# Patient Record
Sex: MALE | Race: WHITE | NOT HISPANIC OR LATINO | URBAN - METROPOLITAN AREA
[De-identification: names, ages, dates, MRNs, and addresses within clinical notes are randomized per-mention and may not be internally consistent; named-entity substitution may affect disease eponyms.]

---

## 2018-10-09 ENCOUNTER — OFFICE VISIT (OUTPATIENT)
Dept: URBAN - METROPOLITAN AREA CLINIC 76 | Facility: CLINIC | Age: 83
End: 2018-10-09
Payer: COMMERCIAL

## 2018-10-09 DIAGNOSIS — H02.834 DERMATOCHALASIS OF LEFT UPPER EYELID: ICD-10-CM

## 2018-10-09 DIAGNOSIS — H02.831 DERMATOCHALASIS OF RIGHT UPPER EYELID: Primary | ICD-10-CM

## 2018-10-09 PROCEDURE — 99204 OFFICE O/P NEW MOD 45 MIN: CPT | Performed by: OPHTHALMOLOGY

## 2018-10-09 ASSESSMENT — INTRAOCULAR PRESSURE
OD: 12
OS: 10

## 2018-10-09 NOTE — IMPRESSION/PLAN
Impression: Dermatochalasis of right upper eyelid: H02.831. Plan: Discussed diagnosis in detail with patient. Discussed treatment options with patient. Surgical treatment is required. Surgical risks and benefits were discussed, explained and understood by patient. Patient elects to have surgery. RL3. HVF 36 superior taped and untaped ordered today, if visually significant recommend Bilateral Upper Eyelid Blepharoplasty. Photos taken today. Need clearance for blood thinners.

## 2020-11-10 ENCOUNTER — OFFICE VISIT (OUTPATIENT)
Dept: URBAN - METROPOLITAN AREA CLINIC 81 | Facility: CLINIC | Age: 85
End: 2020-11-10
Payer: COMMERCIAL

## 2020-11-10 DIAGNOSIS — H52.4 PRESBYOPIA: ICD-10-CM

## 2020-11-10 DIAGNOSIS — H35.3122 NONEXUDATIVE AGE-RELATED MACULAR DEGENERATION OF LEFT EYE, INTERMEDIATE DRY STAGE: ICD-10-CM

## 2020-11-10 DIAGNOSIS — H35.372 PUCKERING OF MACULA, LEFT EYE: ICD-10-CM

## 2020-11-10 DIAGNOSIS — H35.3114 NONEXUDATIVE AGE-RELATED MACULAR DEGENERATION OF RIGHT EYE, ADVANCED ATROPHIC WITH SUBFOVEAL INVOLVEMENT: Primary | ICD-10-CM

## 2020-11-10 DIAGNOSIS — H25.813 COMBINED FORMS OF AGE-RELATED CATARACT, BILATERAL: ICD-10-CM

## 2020-11-10 DIAGNOSIS — H43.813 VITREOUS DEGENERATION, BILATERAL: ICD-10-CM

## 2020-11-10 PROCEDURE — 92014 COMPRE OPH EXAM EST PT 1/>: CPT | Performed by: OPTOMETRIST

## 2020-11-10 ASSESSMENT — KERATOMETRY
OD: 41.88
OS: 40.25

## 2020-11-10 ASSESSMENT — INTRAOCULAR PRESSURE
OS: 12
OD: 14

## 2020-11-10 ASSESSMENT — VISUAL ACUITY: OS: 20/40

## 2020-11-10 NOTE — IMPRESSION/PLAN
Impression: Nonexudative age-related macular degeneration of left eye, intermediate dry stage: H35.3122. Plan: Monitor - recommend continued AREDs MV. Vision stable. 

Recommend monitoring 6 months w/ OCT MAC

## 2020-11-10 NOTE — IMPRESSION/PLAN
Impression: Nonexudative age-related macular degeneration of right eye, advanced atrophic with subfoveal involvement: H35.3114.

 - stable w/o bleed Plan: Monitor

## 2021-04-20 ENCOUNTER — OFFICE VISIT (OUTPATIENT)
Dept: URBAN - METROPOLITAN AREA CLINIC 81 | Facility: CLINIC | Age: 86
End: 2021-04-20
Payer: COMMERCIAL

## 2021-04-20 DIAGNOSIS — H04.123 DRY EYE SYNDROME OF BILATERAL LACRIMAL GLANDS: Chronic | ICD-10-CM

## 2021-04-20 PROCEDURE — 92014 COMPRE OPH EXAM EST PT 1/>: CPT | Performed by: OPTOMETRIST

## 2021-04-20 PROCEDURE — 92133 CPTRZD OPH DX IMG PST SGM ON: CPT | Performed by: OPTOMETRIST

## 2021-04-20 ASSESSMENT — INTRAOCULAR PRESSURE
OS: 13
OD: 14

## 2021-04-20 NOTE — IMPRESSION/PLAN
Impression: Nonexudative age-related macular degeneration of left eye, intermediate dry stage: H35.3122. Plan: Same as plan 1.

## 2021-04-20 NOTE — IMPRESSION/PLAN
Impression: Nonexudative age-related macular degeneration of right eye, advanced atrophic with subfoveal involvement: H35.3114. Plan: Macular degeneration, dry type with stable vision. Order OCT macula baseline, reviewed today. Education on dry versus wet ARMD. Dispensed pamphlet on ARMD and Amsler grid. Education on AREDS 2 PO supplements, continue AREDS 2 PO supplements. Patient advised to RTC immediately if any vision changes occur.

## 2021-10-20 ENCOUNTER — OFFICE VISIT (OUTPATIENT)
Dept: URBAN - METROPOLITAN AREA CLINIC 81 | Facility: CLINIC | Age: 86
End: 2021-10-20
Payer: COMMERCIAL

## 2021-10-20 PROCEDURE — 99214 OFFICE O/P EST MOD 30 MIN: CPT | Performed by: OPTOMETRIST

## 2021-10-20 PROCEDURE — 92134 CPTRZ OPH DX IMG PST SGM RTA: CPT | Performed by: OPTOMETRIST

## 2021-10-20 ASSESSMENT — INTRAOCULAR PRESSURE
OD: 11
OS: 13

## 2021-10-20 ASSESSMENT — VISUAL ACUITY: OS: 20/50

## 2021-10-20 NOTE — IMPRESSION/PLAN
Impression: Combined forms of age-related cataract, bilateral: H25.813. Plan: Cataracts account for the patient's complaints. Discussed all risks, benefits, procedures and recovery of cataracts surgery. Patient understands that changing the glasses prescription will not significantly improve vision.
-Patient elects to try updating glasses at this time, and not proceed with surgery. RTC PRN for non dilated refraction. Advised to call if desires to proceed with cataract surgery consult.

## 2021-10-20 NOTE — IMPRESSION/PLAN
Impression: Nonexudative age-related macular degeneration of right eye, advanced atrophic with subfoveal involvement: H35.3114. Plan: Macular degeneration, dry type with stable vision. Education on dry versus wet ARMD. Continue to monitor at home with Amsler grid. Education on AREDS 2 PO supplements, continue AREDS 2 PO supplements. Patient advised to RTC immediately if any vision changes occur.

## 2021-11-11 ENCOUNTER — TESTING ONLY (OUTPATIENT)
Dept: URBAN - METROPOLITAN AREA CLINIC 81 | Facility: CLINIC | Age: 86
End: 2021-11-11
Payer: COMMERCIAL

## 2021-11-11 ASSESSMENT — KERATOMETRY
OS: 41.88
OD: 41.88

## 2021-11-11 ASSESSMENT — VISUAL ACUITY
OD: ECC
OS: 20/40

## 2021-11-11 NOTE — IMPRESSION/PLAN
Impression: Presbyopia: H52.4.
-refraction only today
-trialed reading and computer distance Plan: Dispensed Rx for glasses to patient and instructed on normal adaptation period. Patient appreciates mild improvement with MRx at distance and near compared to habitual Rx. Patient elects to consider cataract surgery at this time. Advised patient to wait to update glasses and proceed with cataract consult.

## 2021-11-17 ENCOUNTER — PRE-OPERATIVE VISIT (OUTPATIENT)
Dept: URBAN - METROPOLITAN AREA CLINIC 76 | Facility: CLINIC | Age: 86
End: 2021-11-17
Payer: COMMERCIAL

## 2021-11-17 ASSESSMENT — PACHYMETRY
OS: 24.47
OS: 2.86
OD: 24.88
OD: 2.68

## 2022-04-20 ENCOUNTER — OFFICE VISIT (OUTPATIENT)
Dept: URBAN - METROPOLITAN AREA CLINIC 81 | Facility: CLINIC | Age: 87
End: 2022-04-20
Payer: COMMERCIAL

## 2022-04-20 DIAGNOSIS — H25.813 COMBINED FORMS OF AGE-RELATED CATARACT, BILATERAL: ICD-10-CM

## 2022-04-20 DIAGNOSIS — H35.3122 NONEXUDATIVE AGE-RELATED MACULAR DEGENERATION OF LEFT EYE, INTERMEDIATE DRY STAGE: ICD-10-CM

## 2022-04-20 DIAGNOSIS — H35.3114 NONEXUDATIVE AGE-RELATED MACULAR DEGENERATION, RIGHT EYE, ADVANCED ATROPHIC WITH SUBFOVEAL INVOLVEMENT: Primary | ICD-10-CM

## 2022-04-20 PROCEDURE — 99214 OFFICE O/P EST MOD 30 MIN: CPT | Performed by: OPTOMETRIST

## 2022-04-20 PROCEDURE — 92134 CPTRZ OPH DX IMG PST SGM RTA: CPT | Performed by: OPTOMETRIST

## 2022-04-20 ASSESSMENT — INTRAOCULAR PRESSURE
OD: 12
OS: 12

## 2022-04-20 NOTE — IMPRESSION/PLAN
Impression: Combined forms of age-related cataract, bilateral: H25.813. Plan: Cataracts account for the patient's complaints. Discussed all risks, benefits, procedures and recovery of cataracts surgery. Patient understands that changing the glasses prescription will not significantly improve vision.
-Patient elects to  not proceed with surgery. -Advised to call if desires to proceed with cataract surgery consult.

## 2022-04-20 NOTE — IMPRESSION/PLAN
Impression: Nonexudative age-related macular degeneration of right eye, advanced atrophic with subfoveal involvement: H35.3114.

-order OCT mac, reviewed today, stable OU Plan: Macular degeneration, dry type with stable vision. Education on dry versus wet ARMD. Continue to monitor at home with Amsler grid. Education on AREDS 2 PO supplements, continue AREDS 2 PO supplements. Patient advised to RTC immediately if any vision changes occur.

## 2022-07-28 ENCOUNTER — OFFICE VISIT (OUTPATIENT)
Dept: URBAN - METROPOLITAN AREA CLINIC 81 | Facility: CLINIC | Age: 87
End: 2022-07-28
Payer: COMMERCIAL

## 2022-07-28 DIAGNOSIS — H25.813 COMBINED FORMS OF AGE-RELATED CATARACT, BILATERAL: ICD-10-CM

## 2022-07-28 DIAGNOSIS — H52.4 PRESBYOPIA: Primary | ICD-10-CM

## 2022-07-28 PROCEDURE — 92014 COMPRE OPH EXAM EST PT 1/>: CPT | Performed by: OPTOMETRIST

## 2022-07-28 ASSESSMENT — KERATOMETRY
OS: 42.13
OD: 41.88

## 2022-07-28 ASSESSMENT — INTRAOCULAR PRESSURE
OD: 12
OS: 13

## 2022-07-28 ASSESSMENT — VISUAL ACUITY: OS: 20/60

## 2022-07-28 NOTE — IMPRESSION/PLAN
Impression: Presbyopia: H52.4. Plan: Discussed condition in detail with patient. Dispensed Rx for glasses to patient and instructed on normal adaptation period. Discussed cataracts are vision limiting factor. Minimal improvement with refraction.

## 2022-08-08 ENCOUNTER — OFFICE VISIT (OUTPATIENT)
Dept: URBAN - METROPOLITAN AREA CLINIC 81 | Facility: CLINIC | Age: 87
End: 2022-08-08
Payer: COMMERCIAL

## 2022-08-08 ENCOUNTER — OFFICE VISIT (OUTPATIENT)
Dept: URBAN - METROPOLITAN AREA CLINIC 7 | Facility: CLINIC | Age: 87
End: 2022-08-08
Payer: COMMERCIAL

## 2022-08-08 DIAGNOSIS — H35.3122 NONEXUDATIVE AGE-RELATED MACULAR DEGENERATION OF LEFT EYE, INTERMEDIATE DRY STAGE: ICD-10-CM

## 2022-08-08 DIAGNOSIS — H35.3221 EXUDATIVE AGE-RELATED MACULAR DEGENERATION, LEFT EYE, WITH ACTIVE CHOROIDAL NEOVASCULARIZATION: Primary | ICD-10-CM

## 2022-08-08 DIAGNOSIS — H35.3114 NONEXUDATIVE AGE-RELATED MACULAR DEGENERATION, RIGHT EYE, ADVANCED ATROPHIC WITH SUBFOVEAL INVOLVEMENT: ICD-10-CM

## 2022-08-08 DIAGNOSIS — H35.3112 NONEXUDATIVE AGE-RELATED MACULAR DEGENERATION, RIGHT EYE, INTERMEDIATE DRY STAGE: ICD-10-CM

## 2022-08-08 DIAGNOSIS — H43.813 VITREOUS DEGENERATION, BILATERAL: ICD-10-CM

## 2022-08-08 DIAGNOSIS — H25.13 AGE-RELATED NUCLEAR CATARACT, BILATERAL: ICD-10-CM

## 2022-08-08 DIAGNOSIS — H25.813 COMBINED FORMS OF AGE-RELATED CATARACT, BILATERAL: ICD-10-CM

## 2022-08-08 DIAGNOSIS — H33.012 RETINAL DETACHMENT WITH SINGLE BREAK, LEFT EYE: Primary | ICD-10-CM

## 2022-08-08 DIAGNOSIS — H02.125 MECHANICAL ECTROPION OF LEFT LOWER EYELID: ICD-10-CM

## 2022-08-08 PROCEDURE — 99204 OFFICE O/P NEW MOD 45 MIN: CPT | Performed by: OPHTHALMOLOGY

## 2022-08-08 PROCEDURE — 92134 CPTRZ OPH DX IMG PST SGM RTA: CPT | Performed by: OPHTHALMOLOGY

## 2022-08-08 PROCEDURE — 76512 OPH US DX B-SCAN: CPT | Performed by: OPHTHALMOLOGY

## 2022-08-08 PROCEDURE — 99214 OFFICE O/P EST MOD 30 MIN: CPT | Performed by: OPTOMETRIST

## 2022-08-08 PROCEDURE — 92134 CPTRZ OPH DX IMG PST SGM RTA: CPT | Performed by: OPTOMETRIST

## 2022-08-08 PROCEDURE — 92235 FLUORESCEIN ANGRPH MLTIFRAME: CPT | Performed by: OPHTHALMOLOGY

## 2022-08-08 ASSESSMENT — VISUAL ACUITY: OS: 20/50

## 2022-08-08 ASSESSMENT — INTRAOCULAR PRESSURE
OS: 12
OS: 7
OS: 7
OS: 12
OD: 12
OD: 12

## 2022-08-08 NOTE — IMPRESSION/PLAN
Impression: Combined forms of age-related cataract, bilateral: H25.813.

-Patient would like near target OS at time of cataract sx. 
-guarded prognosis due to ARMD
-Patient noticed mild improvement with new glasses purchased last month, but desires to proceed with cataract surgery. Plan: Cataracts account for the patient's complaints. Discussed all risks, benefits, procedures and recovery of cataracts surgery. Patient understands that changing the glasses prescription will not significantly improve vision. Patient desires to have surgery, refer to Dr. Negar Andrea for evaluation of phacoemulsification with IOL.

## 2022-08-08 NOTE — IMPRESSION/PLAN
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 Nasal 
-mild nasal LLL
-No corneal involvement Plan: Discussed diagnosis with patient in detail. No treatment is required at this time. Will continue to observe condition and/or symptoms. Patient instructed to call if condition gets worse.

## 2022-08-08 NOTE — IMPRESSION/PLAN
Impression: Exudative age-related macular degeneration, left eye, with active choroidal neovascularization: H35.3221. OCT OU = no SRF/IRF OU central atrophy OD  / 280 FA OU 08/08/2022 = no leak in central mac OU window defect OD
B-scan OD 08/08/2022 = [facility] without RD Plan: Mild heme in temp macula Peripheral neovascularization with [facility] Symptomatic Check B-scan and OCT/FA with sweeps Rec anti-VEGF treatment as series Discussed R,B,A of Avastin vs Lucentis vs Eylea vs Beovu vs Vabysmo injection. Discussed signs and symptoms of inflammation, endophthalmitis, vitreous hemorrhage, retinal tear, retinal detachment. Patient understands and wishes to proceed with Eylea injection. this week Eylea OS (Sue) then 1m OCT/Eylea OS #2/3

## 2022-08-08 NOTE — IMPRESSION/PLAN
Impression: Retinal detachment with single break, left eye: H33.012.

-new vision change 4 days ago, denies flashes. Plan: Discussed, Recommend Retina consult ASAP for eval/treat/clearance for cataract surgery.

## 2022-08-08 NOTE — IMPRESSION/PLAN
Impression: Vitreous degeneration, bilateral: H43.813. Plan: Posterior vitreous detachment accounts for the patient's complaints. Discussed signs and symptoms of PVD/floaters. Signs and symptoms of retinal detachment were discussed in detail. No treatment is required at this time. Patient instructed to call immediately if any signs or symptoms of retinal detachments occur.

## 2022-08-12 ENCOUNTER — OFFICE VISIT (OUTPATIENT)
Facility: LOCATION | Age: 87
End: 2022-08-12
Payer: MEDICARE

## 2022-08-12 PROCEDURE — 67028 INJECTION EYE DRUG: CPT | Performed by: OPHTHALMOLOGY

## 2022-08-12 ASSESSMENT — INTRAOCULAR PRESSURE
OS: 10
OD: 13

## 2022-08-12 NOTE — IMPRESSION/PLAN
Impression: Exudative age-related macular degeneration, left eye, with active choroidal neovascularization: H35.3221. OCT OU = no SRF/IRF OU central atrophy OD  / 280 FA OU 08/08/2022 = no leak in central mac OU window defect OD
B-scan OD 08/08/2022 = [facility] without RD Plan:

## 2022-09-15 ENCOUNTER — OFFICE VISIT (OUTPATIENT)
Dept: URBAN - METROPOLITAN AREA CLINIC 81 | Facility: CLINIC | Age: 87
End: 2022-09-15
Payer: COMMERCIAL

## 2022-09-15 DIAGNOSIS — H35.3221 EXUDATIVE AGE-RELATED MACULAR DEGENERATION, LEFT EYE, WITH ACTIVE CHOROIDAL NEOVASCULARIZATION: ICD-10-CM

## 2022-09-15 DIAGNOSIS — H25.813 COMBINED FORMS OF AGE-RELATED CATARACT, BILATERAL: Primary | ICD-10-CM

## 2022-09-15 PROCEDURE — 99214 OFFICE O/P EST MOD 30 MIN: CPT | Performed by: OPTOMETRIST

## 2022-09-15 PROCEDURE — 92134 CPTRZ OPH DX IMG PST SGM RTA: CPT | Performed by: OPTOMETRIST

## 2022-09-15 ASSESSMENT — KERATOMETRY
OS: 41.75
OD: 41.75

## 2022-09-15 ASSESSMENT — INTRAOCULAR PRESSURE
OS: 10
OD: 13

## 2022-09-15 ASSESSMENT — VISUAL ACUITY: OS: 20/60

## 2022-09-15 NOTE — IMPRESSION/PLAN
Impression: Exudative age-related macular degeneration, left eye, with active choroidal neovascularization: H35.3221.
-s/p Eyelea OS 08/12/2022
-order and performed OCT mac today FA OU 08/08/2022 = no leak in central mac OU window defect OD
B-scan OD 08/08/2022 = [facility] without RD Plan: Discussed. Under care of Dr. Corrinne Ket, continue care planned.

## 2022-09-15 NOTE — IMPRESSION/PLAN
Impression: Combined forms of age-related cataract, bilateral: H25.813.
-Patient would like near target OS at time of cataract sx. 
-guarded prognosis due to ARMD
-Patient noticed mild improvement with new glasses purchased last month, but desires to proceed with cataract surgery, since vision is affecting daily life. Plan: Cataracts account for the patient's complaints. Discussed all risks, benefits, procedures and recovery of cataracts surgery. Patient understands that changing the glasses prescription will not significantly improve vision. Patient desires to have surgery, refer to Dr. Kaelyn Guzmán for evaluation of phacoemulsification with IOL.
-Discussed guarded prognosis due to ARMD OD>OS, patient understands.

## 2022-10-06 ENCOUNTER — PRE-OPERATIVE VISIT (OUTPATIENT)
Dept: URBAN - METROPOLITAN AREA CLINIC 76 | Facility: CLINIC | Age: 87
End: 2022-10-06
Payer: COMMERCIAL

## 2022-10-06 DIAGNOSIS — H25.813 COMBINED FORMS OF AGE-RELATED CATARACT, BILATERAL: Primary | ICD-10-CM

## 2022-10-06 ASSESSMENT — PACHYMETRY
OS: 24.50
OD: 24.95
OD: 4.84
OS: 4.91

## 2022-10-07 ENCOUNTER — OFFICE VISIT (OUTPATIENT)
Facility: LOCATION | Age: 87
End: 2022-10-07
Payer: COMMERCIAL

## 2022-10-07 DIAGNOSIS — H35.3221 EXUDATIVE AGE-RELATED MACULAR DEGENERATION, LEFT EYE, WITH ACTIVE CHOROIDAL NEOVASCULARIZATION: Primary | ICD-10-CM

## 2022-10-07 DIAGNOSIS — H25.13 AGE-RELATED NUCLEAR CATARACT, BILATERAL: ICD-10-CM

## 2022-10-07 DIAGNOSIS — H35.3112 NONEXUDATIVE AGE-RELATED MACULAR DEGENERATION, RIGHT EYE, INTERMEDIATE DRY STAGE: ICD-10-CM

## 2022-10-07 DIAGNOSIS — H43.813 VITREOUS DEGENERATION, BILATERAL: ICD-10-CM

## 2022-10-07 PROCEDURE — 67028 INJECTION EYE DRUG: CPT | Performed by: OPHTHALMOLOGY

## 2022-10-07 PROCEDURE — 92134 CPTRZ OPH DX IMG PST SGM RTA: CPT | Performed by: OPHTHALMOLOGY

## 2022-10-07 ASSESSMENT — INTRAOCULAR PRESSURE
OS: 12
OD: 17

## 2022-10-07 NOTE — IMPRESSION/PLAN
Impression: Exudative age-related macular degeneration, left eye, with active choroidal neovascularization: H35.3221. OCT OU = no SRF/IRF OU central atrophy OD  / 259 FA OU 08/08/2022 = no leak in central mac OU window defect OD
B-scan OD 08/08/2022 = [facility] without RD
s/p Eylea OS - 08/12/2022 Plan: Improved temp [facility] today s/p Eylea @ 2m Patient is having cataract surgery. Rec continue a2m series OU with hopes of T&E in the future. D/w patient in detail. Discussed R,B,A of Avastin vs Lucentis vs Eylea vs Beovu vs Vabysmo injection. Discussed signs and symptoms of inflammation, endophthalmitis, vitreous hemorrhage, retinal tear, retinal detachment. Patient understands and wishes to proceed with Eylea injection today. Timeout was performed before procedure. After 2% Subconjunctival anesthesia & betadine prep, Eylea  was injected with no complications. Overfill discarded. 

2m OCT OU Eylea OS 2/3

## 2022-10-11 ENCOUNTER — OFFICE VISIT (OUTPATIENT)
Dept: URBAN - METROPOLITAN AREA CLINIC 76 | Facility: CLINIC | Age: 87
End: 2022-10-11
Payer: MEDICARE

## 2022-10-11 DIAGNOSIS — H35.3112 NONEXUDATIVE AGE-RELATED MACULAR DEGENERATION, RIGHT EYE, INTERMEDIATE DRY STAGE: ICD-10-CM

## 2022-10-11 DIAGNOSIS — H43.813 VITREOUS DEGENERATION, BILATERAL: ICD-10-CM

## 2022-10-11 DIAGNOSIS — H25.13 AGE-RELATED NUCLEAR CATARACT, BILATERAL: Primary | ICD-10-CM

## 2022-10-11 DIAGNOSIS — H35.3221 EXUDATIVE AGE-RELATED MACULAR DEGENERATION, LEFT EYE, WITH ACTIVE CHOROIDAL NEOVASCULARIZATION: ICD-10-CM

## 2022-10-11 PROCEDURE — 99204 OFFICE O/P NEW MOD 45 MIN: CPT | Performed by: STUDENT IN AN ORGANIZED HEALTH CARE EDUCATION/TRAINING PROGRAM

## 2022-10-11 ASSESSMENT — VISUAL ACUITY
OS: 20/60
OD: CF 5FT

## 2022-10-11 ASSESSMENT — INTRAOCULAR PRESSURE
OS: 11
OD: 14

## 2022-10-11 NOTE — IMPRESSION/PLAN
Impression: Age-related nuclear cataract, bilateral: H25.13. Plan: Chart has been reviewed and additional testing is necessary including A-scan/Lens Biometry and macula OCT. Discussed cataracts, treatment options, and surgical risks/benefits with patient including bleeding, infection, capsular break, glaucoma, corneal clouding. Patient understands there are tradeoffs to each intraocular lens choice and glasses may still be necessary after surgery. Pt understands that multifocal intraocular lenses have side effects including but not limited to Halos/Glare/Difficulty in Dim Lighting and Intermediate vision. The patient is bothered by the symptoms of his/her cataract which is not correctable with a change in glasses and their ADL's are impaired. Patient elects surgical treatment and wishes to proceed. There is reasonable expectation that both the patient's visual and functional status will improve after surgery. Post op care to be patient's choice of provider/clinic. Pt very motivated for near target. Had extensive discussion regarding DVA target vs. NVA target. Pt understands. Advised pt he is not safe to drive with current level of vision. Recommend ORA. Recommend Dextenza + Moxifloxacin (PF) Intracameral Injection Lens Recommendation: [employer] Technology: OK for [employer] Aim OD: -2.50. Aim OS: -2.50. First Eye: OD THEN OS Notes: small pupil, may need Malyugin ring

## 2022-10-11 NOTE — IMPRESSION/PLAN
Impression: Exudative age-related macular degeneration, left eye, with active choroidal neovascularization: H35.0372. Plan: Discussed guarded prognosis. Continue to monitor closely with Dr. Jamar Demarco.

## 2022-10-11 NOTE — IMPRESSION/PLAN
Impression: Nonexudative age-related macular degeneration, right eye, intermediate dry stage: H35.3112. Plan: See above.

## 2022-11-07 ENCOUNTER — SURGERY (OUTPATIENT)
Dept: URBAN - METROPOLITAN AREA SURGERY 47 | Facility: SURGERY | Age: 87
End: 2022-11-07
Payer: COMMERCIAL

## 2022-11-07 DIAGNOSIS — H25.13 AGE-RELATED NUCLEAR CATARACT, BILATERAL: Primary | ICD-10-CM

## 2022-11-07 PROCEDURE — 66984 XCAPSL CTRC RMVL W/O ECP: CPT | Performed by: STUDENT IN AN ORGANIZED HEALTH CARE EDUCATION/TRAINING PROGRAM

## 2022-11-07 PROCEDURE — PR1CP PR1CP: CUSTOM | Performed by: STUDENT IN AN ORGANIZED HEALTH CARE EDUCATION/TRAINING PROGRAM

## 2022-11-08 ENCOUNTER — POST-OPERATIVE VISIT (OUTPATIENT)
Dept: URBAN - METROPOLITAN AREA CLINIC 81 | Facility: CLINIC | Age: 87
End: 2022-11-08
Payer: COMMERCIAL

## 2022-11-08 DIAGNOSIS — Z48.810 ENCOUNTER FOR SURGICAL AFTERCARE FOLLOWING SURGERY ON A SENSE ORGAN: Primary | ICD-10-CM

## 2022-11-08 ASSESSMENT — INTRAOCULAR PRESSURE
OS: 10
OD: 20

## 2022-11-08 NOTE — IMPRESSION/PLAN
Impression: S/P Cataract Extraction by phacoemulsification with IOL placement; ORA OD - 1 Day. Encounter for surgical aftercare following surgery on a sense organ  Z48.810. Post operative instructions reviewed - Plan: Discussed, reviewed PO instructions. Due to amount of cornea edema, start Lotemax SM OD TID until PO f/up in 1 week, samples dispensed to patient. Monitor.

## 2022-11-14 ENCOUNTER — POST-OPERATIVE VISIT (OUTPATIENT)
Dept: URBAN - METROPOLITAN AREA CLINIC 81 | Facility: CLINIC | Age: 87
End: 2022-11-14
Payer: COMMERCIAL

## 2022-11-14 DIAGNOSIS — Z48.810 ENCOUNTER FOR SURGICAL AFTERCARE FOLLOWING SURGERY ON A SENSE ORGAN: Primary | ICD-10-CM

## 2022-11-14 ASSESSMENT — VISUAL ACUITY
OS: CF 3FT
OD: 20/40

## 2022-11-14 ASSESSMENT — INTRAOCULAR PRESSURE
OD: 18
OS: 12

## 2022-11-14 NOTE — IMPRESSION/PLAN
Impression: S/P Cataract Extraction by phacoemulsification with IOL placement; ORA OD - 7 Days. Encounter for surgical aftercare following surgery on a sense organ  Z48.810. Excellent post op course Plan: Discussed, stable, cornea edema resolved. Taper Lotemax SM OD QAM x 1 wk and D/C. Monitor. Proceed with surgery OS.

## 2022-11-21 ENCOUNTER — SURGERY (OUTPATIENT)
Dept: URBAN - METROPOLITAN AREA SURGERY 47 | Facility: SURGERY | Age: 87
End: 2022-11-21
Payer: COMMERCIAL

## 2022-11-21 DIAGNOSIS — H25.12 AGE-RELATED NUCLEAR CATARACT, LEFT EYE: Primary | ICD-10-CM

## 2022-11-21 PROCEDURE — PR1CP PR1CP: CUSTOM | Performed by: STUDENT IN AN ORGANIZED HEALTH CARE EDUCATION/TRAINING PROGRAM

## 2022-11-21 PROCEDURE — 66984 XCAPSL CTRC RMVL W/O ECP: CPT | Performed by: STUDENT IN AN ORGANIZED HEALTH CARE EDUCATION/TRAINING PROGRAM

## 2022-11-22 ENCOUNTER — POST-OPERATIVE VISIT (OUTPATIENT)
Dept: URBAN - METROPOLITAN AREA CLINIC 81 | Facility: CLINIC | Age: 87
End: 2022-11-22
Payer: COMMERCIAL

## 2022-11-22 DIAGNOSIS — Z96.1 PRESENCE OF INTRAOCULAR LENS: Primary | ICD-10-CM

## 2022-11-22 ASSESSMENT — INTRAOCULAR PRESSURE
OS: 17
OD: 15

## 2022-11-22 NOTE — IMPRESSION/PLAN
Impression: S/P Cataract Extraction by phacoemulsification with IOL placement; ORA OS - 1 Day. Presence of intraocular lens  Z96.1. Post operative instructions reviewed - Plan: Discussed, near target OU. D/C Lotemax SM OD. Use artificial tears OU QID this week. Monitor.

## 2022-11-28 ENCOUNTER — POST-OPERATIVE VISIT (OUTPATIENT)
Dept: URBAN - METROPOLITAN AREA CLINIC 81 | Facility: CLINIC | Age: 87
End: 2022-11-28
Payer: COMMERCIAL

## 2022-11-28 DIAGNOSIS — Z96.1 PRESENCE OF INTRAOCULAR LENS: Primary | ICD-10-CM

## 2022-11-28 ASSESSMENT — INTRAOCULAR PRESSURE
OS: 14
OD: 14

## 2022-11-28 ASSESSMENT — VISUAL ACUITY
OS: 20/50
OD: CF 3FT

## 2022-11-28 NOTE — IMPRESSION/PLAN
Impression: S/P Cataract Extraction by phacoemulsification with IOL placement; ORA OS - 7 Days. Presence of intraocular lens  Z96.1. Plan: Discussed, stable. Discussed patient should not be driving, for his safety and the safety of others, we will prescribe glasses in 2 weeks and you can drive legally with new glasses.

## 2022-12-02 ENCOUNTER — PROCEDURE (OUTPATIENT)
Facility: LOCATION | Age: 87
End: 2022-12-02
Payer: COMMERCIAL

## 2022-12-02 DIAGNOSIS — H35.3221 EXUDATIVE AGE-RELATED MACULAR DEGENERATION, LEFT EYE, WITH ACTIVE CHOROIDAL NEOVASCULARIZATION: Primary | ICD-10-CM

## 2022-12-02 PROCEDURE — 92134 CPTRZ OPH DX IMG PST SGM RTA: CPT | Performed by: OPHTHALMOLOGY

## 2022-12-02 PROCEDURE — 67028 INJECTION EYE DRUG: CPT | Performed by: OPHTHALMOLOGY

## 2022-12-02 ASSESSMENT — INTRAOCULAR PRESSURE
OD: 15
OS: 15

## 2022-12-06 ENCOUNTER — OFFICE VISIT (OUTPATIENT)
Dept: URBAN - METROPOLITAN AREA CLINIC 81 | Facility: CLINIC | Age: 87
End: 2022-12-06
Payer: COMMERCIAL

## 2022-12-06 DIAGNOSIS — H11.32 CONJUNCTIVAL HEMORRHAGE, LEFT EYE: Primary | ICD-10-CM

## 2022-12-06 PROCEDURE — 99212 OFFICE O/P EST SF 10 MIN: CPT | Performed by: OPTOMETRIST

## 2022-12-06 ASSESSMENT — INTRAOCULAR PRESSURE
OD: 12
OS: 10

## 2022-12-06 NOTE — IMPRESSION/PLAN
Impression: Conjunctival hemorrhage, left eye: H11.32.
-likely from Eylea injection Plan: Discussed diagnosis in detail with patient, reassured patient. Negative history of bleeding or clotting disorders. Patient on Aspirin PO, longterm. Discussed possible etiology including anti-VEGF injections, valsalva, anticoagulant medications, eye trauma, hypertension, and idiopathic. Advise blood work-up if recurrent. Can use Systane Complete or Refresh Relieva OU BID-QID for comfort. RTC if vision or condition significantly worsens or does not resolve in 3 weeks.

## 2022-12-15 ENCOUNTER — POST-OPERATIVE VISIT (OUTPATIENT)
Dept: URBAN - METROPOLITAN AREA CLINIC 81 | Facility: CLINIC | Age: 87
End: 2022-12-15
Payer: COMMERCIAL

## 2022-12-15 DIAGNOSIS — Z96.1 PRESENCE OF INTRAOCULAR LENS: ICD-10-CM

## 2022-12-15 DIAGNOSIS — H52.4 PRESBYOPIA: Primary | ICD-10-CM

## 2022-12-15 PROCEDURE — 99024 POSTOP FOLLOW-UP VISIT: CPT | Performed by: OPTOMETRIST

## 2022-12-15 ASSESSMENT — VISUAL ACUITY: OS: 20/40

## 2022-12-15 ASSESSMENT — INTRAOCULAR PRESSURE
OD: 12
OS: 10

## 2022-12-15 NOTE — IMPRESSION/PLAN
Impression: S/P Cataract Extraction by phacoemulsification with IOL placement; ORA OS - 24 Days. Presence of intraocular lens  Z96.1. Plan: Discussed, stable. Discussed condition in detail with patient. Dispensed Rx for glasses to patient and instructed on normal adaptation period. Dispensed Rx for bifocals and for computer. Monitor. Continue care with retina as scheduled.

## 2023-03-24 ENCOUNTER — OFFICE VISIT (OUTPATIENT)
Dept: URBAN - METROPOLITAN AREA CLINIC 76 | Facility: CLINIC | Age: 88
End: 2023-03-24
Payer: COMMERCIAL

## 2023-03-24 DIAGNOSIS — H35.3114 NONEXUDATIVE MACULAR DEGENERATION, ADVANCED ATROPHIC WITH SUBFOVEAL INVOLVEMENT, RIGHT EYE: ICD-10-CM

## 2023-03-24 DIAGNOSIS — H35.3221 EXUDATIVE AGE-RELATED MACULAR DEGENERATION, LEFT EYE, WITH ACTIVE CHOROIDAL NEOVASCULARIZATION: Primary | ICD-10-CM

## 2023-03-24 PROCEDURE — 99214 OFFICE O/P EST MOD 30 MIN: CPT | Performed by: OPHTHALMOLOGY

## 2023-03-24 PROCEDURE — 92134 CPTRZ OPH DX IMG PST SGM RTA: CPT | Performed by: OPHTHALMOLOGY

## 2023-03-24 ASSESSMENT — INTRAOCULAR PRESSURE
OS: 11
OD: 12

## 2023-03-24 NOTE — IMPRESSION/PLAN
Impression: Exudative age-related macular degeneration, left eye, with active choroidal neovascularization: H35.3221. Plan: No evidence of CNV on OCT, very low threshold to re-start anti-VEGF therapy, HOLD injections for now. RTC 4-6 weeks [employer] DFEx/OCT mac/FA/ICG transit OS/poss AMOR OS. AREDS 2 and Amsler grid checks.

## 2023-03-24 NOTE — IMPRESSION/PLAN
Impression: Nonexudative macular degeneration, advanced atrophic with subfoveal involvement, right eye: H35.3114. Plan: Foveal-centered GA, observe.

## 2023-05-02 ENCOUNTER — OFFICE VISIT (OUTPATIENT)
Dept: URBAN - METROPOLITAN AREA CLINIC 64 | Facility: CLINIC | Age: 88
End: 2023-05-02
Payer: COMMERCIAL

## 2023-05-02 DIAGNOSIS — H35.3114 NONEXUDATIVE MACULAR DEGENERATION, ADVANCED ATROPHIC WITH SUBFOVEAL INVOLVEMENT, RIGHT EYE: ICD-10-CM

## 2023-05-02 DIAGNOSIS — H35.3221 EXUDATIVE AGE-RELATED MACULAR DEGENERATION, LEFT EYE, WITH ACTIVE CHOROIDAL NEOVASCULARIZATION: Primary | ICD-10-CM

## 2023-05-02 PROCEDURE — 92134 CPTRZ OPH DX IMG PST SGM RTA: CPT | Performed by: OPHTHALMOLOGY

## 2023-05-02 PROCEDURE — 99214 OFFICE O/P EST MOD 30 MIN: CPT | Performed by: OPHTHALMOLOGY

## 2023-05-02 ASSESSMENT — INTRAOCULAR PRESSURE
OD: 9
OS: 11

## 2023-05-02 NOTE — IMPRESSION/PLAN
Impression: Exudative age-related macular degeneration, left eye, with active choroidal neovascularization: H35.3056. Plan: No evidence of CNV on OCT nor on FA today (ICG not done), very low threshold to re-start anti-VEGF therapy, HOLD injections for now but watch very closely - CME possibly tractional. RTC 6 weeks in [address] [place] [address] DFEx/OCT mac/poss AMOR OS. AREDS 2 and Amsler grid checks.

## 2023-06-16 ENCOUNTER — OFFICE VISIT (OUTPATIENT)
Dept: URBAN - METROPOLITAN AREA CLINIC 76 | Facility: CLINIC | Age: 88
End: 2023-06-16
Payer: COMMERCIAL

## 2023-06-16 DIAGNOSIS — H35.3221 EXUDATIVE AGE-RELATED MACULAR DEGENERATION, LEFT EYE, WITH ACTIVE CHOROIDAL NEOVASCULARIZATION: ICD-10-CM

## 2023-06-16 DIAGNOSIS — H35.3114 NONEXUDATIVE AGE-RELATED MACULAR DEGENERATION, RIGHT EYE, ADVANCED ATROPHIC WITH SUBFOVEAL INVOLVEMENT: Primary | ICD-10-CM

## 2023-06-16 PROCEDURE — 99214 OFFICE O/P EST MOD 30 MIN: CPT | Performed by: OPHTHALMOLOGY

## 2023-06-16 PROCEDURE — 92134 CPTRZ OPH DX IMG PST SGM RTA: CPT | Performed by: OPHTHALMOLOGY

## 2023-06-16 ASSESSMENT — INTRAOCULAR PRESSURE
OS: 10
OD: 14

## 2023-06-16 NOTE — IMPRESSION/PLAN
Impression: Exudative age-related macular degeneration, left eye, with active choroidal neovascularization: H35.4800. Plan: No evidence of CNV on OCT nor on FA today (ICG not done) previously, very low threshold to re-start anti-VEGF therapy, HOLD injections for now but watch very closely - CME possibly tractional. RTC 3 months in [address] [place] [address] DFEx/OCT mac/poss AMOR OS. AREDS 2 and Amsler grid checks. **PT REFUSES FUTURE EYE CARE AND UNDERSTANDS THAT PERMANENT VISION LOSS MAY DEVELOP. PT UNDERSTANDS AND ACCEPTS ALL RESPONSIBILITY.